# Patient Record
Sex: MALE | Race: WHITE | Employment: UNEMPLOYED | URBAN - METROPOLITAN AREA
[De-identification: names, ages, dates, MRNs, and addresses within clinical notes are randomized per-mention and may not be internally consistent; named-entity substitution may affect disease eponyms.]

---

## 2023-11-15 ENCOUNTER — OFFICE VISIT (OUTPATIENT)
Age: 23
End: 2023-11-15
Payer: COMMERCIAL

## 2023-11-15 VITALS
HEART RATE: 102 BPM | DIASTOLIC BLOOD PRESSURE: 78 MMHG | TEMPERATURE: 98.4 F | WEIGHT: 239.25 LBS | RESPIRATION RATE: 20 BRPM | HEIGHT: 73 IN | OXYGEN SATURATION: 100 % | SYSTOLIC BLOOD PRESSURE: 126 MMHG | BODY MASS INDEX: 31.71 KG/M2

## 2023-11-15 DIAGNOSIS — Z00.00 WELL ADULT EXAM: Primary | ICD-10-CM

## 2023-11-15 DIAGNOSIS — Z11.59 NEED FOR HEPATITIS C SCREENING TEST: ICD-10-CM

## 2023-11-15 DIAGNOSIS — F33.41 RECURRENT MAJOR DEPRESSIVE DISORDER, IN PARTIAL REMISSION (HCC): ICD-10-CM

## 2023-11-15 DIAGNOSIS — Z13.220 SCREENING, LIPID: ICD-10-CM

## 2023-11-15 DIAGNOSIS — Z23 ENCOUNTER FOR IMMUNIZATION: ICD-10-CM

## 2023-11-15 DIAGNOSIS — Z11.4 SCREENING FOR HIV (HUMAN IMMUNODEFICIENCY VIRUS): ICD-10-CM

## 2023-11-15 PROCEDURE — 90471 IMMUNIZATION ADMIN: CPT | Performed by: STUDENT IN AN ORGANIZED HEALTH CARE EDUCATION/TRAINING PROGRAM

## 2023-11-15 PROCEDURE — 99395 PREV VISIT EST AGE 18-39: CPT | Performed by: STUDENT IN AN ORGANIZED HEALTH CARE EDUCATION/TRAINING PROGRAM

## 2023-11-15 PROCEDURE — 90674 CCIIV4 VAC NO PRSV 0.5 ML IM: CPT | Performed by: STUDENT IN AN ORGANIZED HEALTH CARE EDUCATION/TRAINING PROGRAM

## 2023-11-15 RX ORDER — VENLAFAXINE HYDROCHLORIDE 150 MG/1
150 CAPSULE, EXTENDED RELEASE ORAL DAILY
COMMUNITY
Start: 2023-09-28

## 2023-11-15 RX ORDER — QUETIAPINE FUMARATE 50 MG/1
TABLET, EXTENDED RELEASE ORAL
COMMUNITY
Start: 2023-11-15

## 2023-11-15 SDOH — ECONOMIC STABILITY: INCOME INSECURITY: HOW HARD IS IT FOR YOU TO PAY FOR THE VERY BASICS LIKE FOOD, HOUSING, MEDICAL CARE, AND HEATING?: NOT HARD AT ALL

## 2023-11-15 SDOH — ECONOMIC STABILITY: HOUSING INSECURITY
IN THE LAST 12 MONTHS, WAS THERE A TIME WHEN YOU DID NOT HAVE A STEADY PLACE TO SLEEP OR SLEPT IN A SHELTER (INCLUDING NOW)?: NO

## 2023-11-15 SDOH — ECONOMIC STABILITY: FOOD INSECURITY: WITHIN THE PAST 12 MONTHS, YOU WORRIED THAT YOUR FOOD WOULD RUN OUT BEFORE YOU GOT MONEY TO BUY MORE.: NEVER TRUE

## 2023-11-15 SDOH — ECONOMIC STABILITY: FOOD INSECURITY: WITHIN THE PAST 12 MONTHS, THE FOOD YOU BOUGHT JUST DIDN'T LAST AND YOU DIDN'T HAVE MONEY TO GET MORE.: NEVER TRUE

## 2023-11-15 ASSESSMENT — ENCOUNTER SYMPTOMS
SHORTNESS OF BREATH: 0
VOMITING: 0
RHINORRHEA: 0
CHEST TIGHTNESS: 0
NAUSEA: 0
SORE THROAT: 0
DIARRHEA: 0
CONSTIPATION: 0

## 2023-11-15 ASSESSMENT — PATIENT HEALTH QUESTIONNAIRE - PHQ9
SUM OF ALL RESPONSES TO PHQ QUESTIONS 1-9: 0
SUM OF ALL RESPONSES TO PHQ9 QUESTIONS 1 & 2: 0
1. LITTLE INTEREST OR PLEASURE IN DOING THINGS: 0
2. FEELING DOWN, DEPRESSED OR HOPELESS: 0
SUM OF ALL RESPONSES TO PHQ QUESTIONS 1-9: 0

## 2023-11-15 NOTE — PATIENT INSTRUCTIONS
Patient Education        Learning About the Mediterranean Diet  What is the [address]? The Mediterranean diet is a style of eating rather than a diet plan. It features foods eaten in [facility], [place], [place], and other countries along the [facility]. It emphasizes eating foods like fish, fruits, vegetables, beans, high-fiber breads and whole grains, nuts, and olive oil. This style of eating includes limited red meat, cheese, and sweets. Why choose the Mediterranean diet? A Mediterranean-style diet may improve heart health. It contains more fat than other heart-healthy diets. But the fats are mainly from nuts, unsaturated oils (such as fish oils and olive oil), and certain nut or seed oils (such as canola, soybean, or flaxseed oil). These fats may help protect the heart and blood vessels. How can you get started on the Mediterranean diet? Here are some things you can do to switch to a more Mediterranean way of eating. What to eat  Eat a variety of fruits and vegetables each day, such as grapes, blueberries, tomatoes, broccoli, peppers, figs, olives, spinach, eggplant, beans, lentils, and chickpeas. Eat a variety of whole-grain foods each day, such as oats, brown rice, and whole wheat bread, pasta, and couscous. Eat fish at least 2 times a week. Try tuna, salmon, mackerel, lake trout, herring, or sardines. Eat moderate amounts of low-fat dairy products, such as milk, cheese, or yogurt. Eat moderate amounts of poultry and eggs. Choose healthy (unsaturated) fats, such as nuts, olive oil, and certain nut or seed oils like canola, soybean, and flaxseed. Limit unhealthy (saturated) fats, such as butter, palm oil, and coconut oil. And limit fats found in animal products, such as meat and dairy products made with whole milk. Try to eat red meat only a few times a month in very small amounts. Limit sweets and desserts to only a few times a week.  This includes sugar-sweetened

## 2023-11-15 NOTE — PROGRESS NOTES
After obtaining consent, and per orders of Dr. Paulie Thornton, injection of Reg flu given in Left deltoid by Martina Harrington MA. Patient tolerated the injection well.

## 2023-11-15 NOTE — PROGRESS NOTES
Date of Visit:  11/15/2023  Patient Name: Damien Rocha   Patient :  2000     CHIEF COMPLAINT/HPI:     Damien Rocha is a 26 y.o. male who presents today for an general visit to be evaluated for the following condition(s):  Chief Complaint   Patient presents with    Check-Up     Patient is here today for a checkup and medication review      Last night patient's dad came in to get a refill of Seroquel for the patient. He has been working on weaning off the Seroquel for quite some time now. The last time I saw the patient in the office was May 2022 so I could not provide refill but I did offer to see him for an acute visit today for the purpose of refilling the Seroquel. He tells me today that he actually lost his prescription bottle last night and he went home and found it so he no longer needs a refill. He is doing well on the 50 mg of Seroquel and he is still on 150 mg of Effexor. He is following with his psychiatrist and things are going well from a mental health standpoint. His depression is doing well. He came into the appointment today for the purpose of doing a well visit. He has no new concerns or issues today. We did discuss the obesity and he says he is not really working out and does not follow a strict diet. We did discuss this and the benefits of losing some weight and improving his lifestyle and he will think about this and try to make some changes. He will let me know if he wants any more help. Eye doc and Dentist exams UTD. UTD on shots - Due for flu shot today. UTD on shot he thinks. Psychiatrist is Dr. Olivia Grant (unsure of his last name)  At [address] [place] [address] Psychiatry. REVIEW OF SYSTEM      Review of Systems   Constitutional:  Negative for chills and fever. HENT:  Negative for hearing loss, postnasal drip, rhinorrhea and sore throat. Eyes:  Negative for visual disturbance. Respiratory:  Negative for chest tightness and shortness of breath.

## 2024-02-28 ENCOUNTER — TELEPHONE (OUTPATIENT)
Age: 24
End: 2024-02-28

## 2024-02-28 NOTE — TELEPHONE ENCOUNTER
Patient called he is starting an [employer] course 3/19/24, patient needs TB and flu shot before this, okay for NV?   Appt made with you for 3/27/24  Please advise

## 2024-02-28 NOTE — TELEPHONE ENCOUNTER
NV made for 3/1/24 at 9am, said he doesn't need anything signed by Dr. Lopez before his appt on 3/27/24

## 2024-03-01 ENCOUNTER — NURSE ONLY (OUTPATIENT)
Age: 24
End: 2024-03-01

## 2024-03-01 DIAGNOSIS — Z23 ENCOUNTER FOR IMMUNIZATION: Primary | ICD-10-CM

## 2024-03-01 NOTE — PROGRESS NOTES
TB was given to patient for school immunization check. Tolerated well. Approved by Dr. Lopez. Injection was given left forearm, patient will return Monday before 9AM to have this read

## 2024-03-04 ENCOUNTER — NURSE ONLY (OUTPATIENT)
Age: 24
End: 2024-03-04

## 2024-03-04 LAB
INDURATION: NORMAL
TB SKIN TEST: NEGATIVE

## 2024-03-04 NOTE — PATIENT INSTRUCTIONS
You are now able to schedule your own appointments by using your Da[facility] terry or you can always call the office to schedule at [phone].

## 2024-03-15 ENCOUNTER — NURSE ONLY (OUTPATIENT)
Age: 24
End: 2024-03-15

## 2024-03-15 DIAGNOSIS — Z11.1 VISIT FOR TB SKIN TEST: Primary | ICD-10-CM

## 2024-03-15 NOTE — PROGRESS NOTES
After obtaining consent, and per orders of Dr. Ty, injection of Mantoux skin test given in Left arm by Benito Cameron MA.

## 2024-03-18 LAB
INDURATION: NORMAL
TB SKIN TEST: NEGATIVE

## 2024-03-27 ENCOUNTER — OFFICE VISIT (OUTPATIENT)
Age: 24
End: 2024-03-27
Payer: COMMERCIAL

## 2024-03-27 VITALS
TEMPERATURE: 97.3 F | RESPIRATION RATE: 18 BRPM | HEIGHT: 73 IN | OXYGEN SATURATION: 99 % | SYSTOLIC BLOOD PRESSURE: 126 MMHG | HEART RATE: 101 BPM | BODY MASS INDEX: 31.69 KG/M2 | WEIGHT: 239.13 LBS | DIASTOLIC BLOOD PRESSURE: 80 MMHG

## 2024-03-27 DIAGNOSIS — F33.2 SEVERE RECURRENT MAJOR DEPRESSION WITHOUT PSYCHOTIC FEATURES (HCC): Primary | ICD-10-CM

## 2024-03-27 DIAGNOSIS — R00.0 TACHYCARDIA: ICD-10-CM

## 2024-03-27 DIAGNOSIS — E66.09 CLASS 1 OBESITY DUE TO EXCESS CALORIES WITHOUT SERIOUS COMORBIDITY WITH BODY MASS INDEX (BMI) OF 31.0 TO 31.9 IN ADULT: ICD-10-CM

## 2024-03-27 DIAGNOSIS — Z01.84 IMMUNITY STATUS TESTING: ICD-10-CM

## 2024-03-27 DIAGNOSIS — Z13.220 SCREENING, LIPID: ICD-10-CM

## 2024-03-27 DIAGNOSIS — F41.1 GENERALIZED ANXIETY DISORDER: ICD-10-CM

## 2024-03-27 PROCEDURE — 90471 IMMUNIZATION ADMIN: CPT | Performed by: STUDENT IN AN ORGANIZED HEALTH CARE EDUCATION/TRAINING PROGRAM

## 2024-03-27 PROCEDURE — 90715 TDAP VACCINE 7 YRS/> IM: CPT | Performed by: STUDENT IN AN ORGANIZED HEALTH CARE EDUCATION/TRAINING PROGRAM

## 2024-03-27 PROCEDURE — 99213 OFFICE O/P EST LOW 20 MIN: CPT | Performed by: STUDENT IN AN ORGANIZED HEALTH CARE EDUCATION/TRAINING PROGRAM

## 2024-03-27 ASSESSMENT — PATIENT HEALTH QUESTIONNAIRE - PHQ9
6. FEELING BAD ABOUT YOURSELF - OR THAT YOU ARE A FAILURE OR HAVE LET YOURSELF OR YOUR FAMILY DOWN: NOT AT ALL
10. IF YOU CHECKED OFF ANY PROBLEMS, HOW DIFFICULT HAVE THESE PROBLEMS MADE IT FOR YOU TO DO YOUR WORK, TAKE CARE OF THINGS AT HOME, OR GET ALONG WITH OTHER PEOPLE: NOT DIFFICULT AT ALL
2. FEELING DOWN, DEPRESSED OR HOPELESS: NOT AT ALL
SUM OF ALL RESPONSES TO PHQ QUESTIONS 1-9: 0
9. THOUGHTS THAT YOU WOULD BE BETTER OFF DEAD, OR OF HURTING YOURSELF: NOT AT ALL
SUM OF ALL RESPONSES TO PHQ QUESTIONS 1-9: 0
7. TROUBLE CONCENTRATING ON THINGS, SUCH AS READING THE NEWSPAPER OR WATCHING TELEVISION: NOT AT ALL
8. MOVING OR SPEAKING SO SLOWLY THAT OTHER PEOPLE COULD HAVE NOTICED. OR THE OPPOSITE, BEING SO FIGETY OR RESTLESS THAT YOU HAVE BEEN MOVING AROUND A LOT MORE THAN USUAL: NOT AT ALL
SUM OF ALL RESPONSES TO PHQ9 QUESTIONS 1 & 2: 0
5. POOR APPETITE OR OVEREATING: NOT AT ALL
3. TROUBLE FALLING OR STAYING ASLEEP: NOT AT ALL
4. FEELING TIRED OR HAVING LITTLE ENERGY: NOT AT ALL
1. LITTLE INTEREST OR PLEASURE IN DOING THINGS: NOT AT ALL

## 2024-03-27 ASSESSMENT — ENCOUNTER SYMPTOMS
SORE THROAT: 0
SHORTNESS OF BREATH: 0
DIARRHEA: 0
NAUSEA: 0
CONSTIPATION: 0
RHINORRHEA: 0
VOMITING: 0
CHEST TIGHTNESS: 0

## 2024-03-27 NOTE — PROGRESS NOTES
After obtaining consent, and per orders of Dr. Lopez, injection of TDAP given in Left deltoid by TARAN DE LEON MA. VIS was signed prior to injection, copy was given to the patient. Patient tolerated the injection well.

## 2024-03-27 NOTE — PROGRESS NOTES
Date of Visit:  3/27/2024  Patient Name: Mert Epstein   Patient :  2000     CHIEF COMPLAINT/HPI:     Mert Epstein is a 24 y.o. 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   He has no specific issues to discuss today.  He does not have any signs of illness.  He had TB testing done for different position and he already has this documentation so it was not filled out during his forms today.  He needs some titers for school which will be ordered once these are back we will fill out the rest of the form.  For now his physical exam form was completed.    About 6 months ago he had a wellness physical so today's visit will not be billed that I but we did also discuss his depression and anxiety and is doing very well and it is well-controlled and he has good follow-up with his psychiatrist.      Tachycardia: He did see cardiology in the past had an echo and a Holter which were normal.  No follow-up was needed.  Patient states his heart rate does still run in the 80s to 100s periodically.  We are going to continue to keep an eye on this.  He has an Apple Watch and he monitors it if it consistently starts going higher than 100 I would really visit the cardiologist.    Overall patient is asymptomatic.  He is due for a tetanus shot today.  Risk and benefit discussed he is agreeable to get it.      REVIEW OF SYSTEM      Review of Systems   Constitutional:  Negative for chills and fever.   HENT:  Negative for hearing loss, postnasal drip, rhinorrhea and sore throat.    Eyes:  Negative for visual disturbance.   Respiratory:  Negative for chest tightness and shortness of breath.    Cardiovascular:  Negative for chest pain and

## 2024-04-17 LAB
ALBUMIN SERPL-MCNC: 4.7 G/DL
ALP BLD-CCNC: 72 U/L
ALT SERPL-CCNC: 82 U/L
ANION GAP SERPL CALCULATED.3IONS-SCNC: 10 MMOL/L
AST SERPL-CCNC: 33 U/L
BASOPHILS ABSOLUTE: 0.1 /ΜL
BASOPHILS RELATIVE PERCENT: 0.8 %
BILIRUB SERPL-MCNC: 1 MG/DL (ref 0.1–1.4)
BUN BLDV-MCNC: 10 MG/DL
CALCIUM SERPL-MCNC: 9.7 MG/DL
CHLORIDE BLD-SCNC: 103 MMOL/L
CHOLESTEROL, TOTAL: 113 MG/DL
CHOLESTEROL/HDL RATIO: 2
CO2: 26 MMOL/L
CREAT SERPL-MCNC: 0.61 MG/DL
EGFR: ABNORMAL
EOSINOPHILS ABSOLUTE: 0.1 /ΜL
EOSINOPHILS RELATIVE PERCENT: 2.1 %
GLUCOSE BLD-MCNC: 91 MG/DL
HCT VFR BLD CALC: 41.8 % (ref 41–53)
HDLC SERPL-MCNC: 57 MG/DL (ref 35–70)
HEMOGLOBIN: 14.4 G/DL (ref 13.5–17.5)
LDL CHOLESTEROL CALCULATED: 40 MG/DL (ref 0–160)
LYMPHOCYTES ABSOLUTE: 1.8 /ΜL
LYMPHOCYTES RELATIVE PERCENT: 28.8 %
MCH RBC QN AUTO: 29.3 PG
MCHC RBC AUTO-ENTMCNC: 34.4 G/DL
MCV RBC AUTO: 85 FL
MONOCYTES ABSOLUTE: 0.5 /ΜL
MONOCYTES RELATIVE PERCENT: 8.2 %
NEUTROPHILS ABSOLUTE: 3.9 /ΜL
NEUTROPHILS RELATIVE PERCENT: 60.1 %
NONHDLC SERPL-MCNC: NORMAL MG/DL
PDW BLD-RTO: 13.5 %
PLATELET # BLD: 285 K/ΜL
PMV BLD AUTO: 9.1 FL
POTASSIUM SERPL-SCNC: 3.8 MMOL/L
RBC # BLD: 4.91 10^6/ΜL
SODIUM BLD-SCNC: 139 MMOL/L
TOTAL PROTEIN: 7.3
TRIGL SERPL-MCNC: 81 MG/DL
TSH SERPL DL<=0.05 MIU/L-ACNC: 3.15 UIU/ML
VLDLC SERPL CALC-MCNC: 16 MG/DL
WBC # BLD: 6.4 10^3/ML

## 2024-04-18 DIAGNOSIS — E66.09 CLASS 1 OBESITY DUE TO EXCESS CALORIES WITHOUT SERIOUS COMORBIDITY WITH BODY MASS INDEX (BMI) OF 31.0 TO 31.9 IN ADULT: ICD-10-CM

## 2024-04-18 DIAGNOSIS — Z13.220 SCREENING, LIPID: ICD-10-CM

## 2024-04-18 DIAGNOSIS — F33.2 SEVERE RECURRENT MAJOR DEPRESSION WITHOUT PSYCHOTIC FEATURES (HCC): ICD-10-CM

## 2024-04-18 DIAGNOSIS — F41.1 GENERALIZED ANXIETY DISORDER: ICD-10-CM

## 2024-04-18 DIAGNOSIS — R00.0 TACHYCARDIA: ICD-10-CM

## 2024-04-19 DIAGNOSIS — Z01.84 IMMUNITY STATUS TESTING: ICD-10-CM

## 2024-04-22 ENCOUNTER — OFFICE VISIT (OUTPATIENT)
Age: 24
End: 2024-04-22
Payer: COMMERCIAL

## 2024-04-22 VITALS
HEIGHT: 73 IN | OXYGEN SATURATION: 100 % | HEART RATE: 98 BPM | BODY MASS INDEX: 31.68 KG/M2 | DIASTOLIC BLOOD PRESSURE: 74 MMHG | WEIGHT: 239 LBS | SYSTOLIC BLOOD PRESSURE: 122 MMHG

## 2024-04-22 DIAGNOSIS — R74.01 ALT (SGPT) LEVEL RAISED: Primary | ICD-10-CM

## 2024-04-22 DIAGNOSIS — F41.1 GENERALIZED ANXIETY DISORDER: ICD-10-CM

## 2024-04-22 DIAGNOSIS — Z01.84 IMMUNITY STATUS TESTING: ICD-10-CM

## 2024-04-22 DIAGNOSIS — F33.2 SEVERE RECURRENT MAJOR DEPRESSION WITHOUT PSYCHOTIC FEATURES (HCC): ICD-10-CM

## 2024-04-22 DIAGNOSIS — R00.0 TACHYCARDIA: ICD-10-CM

## 2024-04-22 PROCEDURE — 99213 OFFICE O/P EST LOW 20 MIN: CPT | Performed by: STUDENT IN AN ORGANIZED HEALTH CARE EDUCATION/TRAINING PROGRAM

## 2024-04-22 PROCEDURE — 90746 HEPB VACCINE 3 DOSE ADULT IM: CPT | Performed by: STUDENT IN AN ORGANIZED HEALTH CARE EDUCATION/TRAINING PROGRAM

## 2024-04-22 PROCEDURE — 90471 IMMUNIZATION ADMIN: CPT | Performed by: STUDENT IN AN ORGANIZED HEALTH CARE EDUCATION/TRAINING PROGRAM

## 2024-04-22 ASSESSMENT — ENCOUNTER SYMPTOMS
SHORTNESS OF BREATH: 0
VOMITING: 0
SORE THROAT: 0
RHINORRHEA: 0
CONSTIPATION: 0
NAUSEA: 0
DIARRHEA: 0
CHEST TIGHTNESS: 0

## 2024-04-22 NOTE — PROGRESS NOTES
After obtaining consent, and per orders of Dr. Lopez, injection of Heb B-Engerix given in Left deltoid by ASHELY WHITNEY MA. Patient tolerated the injection well.   
labs from 4/17/2024, CMP, CBC essentially within normal limits.  The ALT was just slightly elevated so we will repeat that test again in 2 weeks.  TSH within normal limits and TB testing was negative.  Lipid panel was favorable.    Return in about 3 months (around 7/22/2024).    If any labs/imaging/testing is ordered then patient is informed that no news is not always good news and that s/he should call the office within 1 week if s/he does not hear from us regarding the results of his/her testing.  Pt understands his/her responsibility in following up on the results. S/he is agreeable to this plan.       COMMUNICATION:       PLEASE NOTE THAT ANY DISCONTINUATION OF MEDICATIONS OR MEDICAL SUPPLIES REFLECTED IN TODAY'S VISIT SUMMARY  MAY NOT HAVE BEEN COMPLETED AS A CHANGE IN YOUR PLAN OF CARE. THESE CHANGES MAY HAVE ONLY BEEN DONE SO IN ORDER TO CLEAN UP THE LIST FROM DUPLICATIONS OR MISCELLANEOUS SUPPLIES ONLY NEEDED PERIODIC REORDERS. DO NOT DISCONTINUE MEDICATIONS LISTED UNLESS SPECIFICALLY DISCUSSED IN YOUR APPOINTMENT WITH PROVIDER OR SPECIALIST, IF YOU HAVE ANY QUESTIONS, PLEASE CONTACT YOUR PROVIDER FOR CLARIFICATION IF NOT ADDRESSED IN YOUR PLAN OF CARE.       Electronically signed by Byron Lopez MD on 4/22/2024 at 10:49 AM  
Strong peripheral pulses

## 2024-05-06 LAB
ALBUMIN SERPL-MCNC: 4.5 G/DL
ALP BLD-CCNC: 68 U/L
ALT SERPL-CCNC: 68 U/L
ANION GAP SERPL CALCULATED.3IONS-SCNC: 12 MMOL/L
AST SERPL-CCNC: 26 U/L
BILIRUB SERPL-MCNC: 0.7 MG/DL (ref 0.1–1.4)
BUN BLDV-MCNC: 11 MG/DL
CALCIUM SERPL-MCNC: 9.4 MG/DL
CHLORIDE BLD-SCNC: 101 MMOL/L
CO2: 27 MMOL/L
CREAT SERPL-MCNC: 0.75 MG/DL
EGFR: NORMAL
GLUCOSE BLD-MCNC: 95 MG/DL
POTASSIUM SERPL-SCNC: 3.9 MMOL/L
SODIUM BLD-SCNC: 140 MMOL/L
TOTAL PROTEIN: 7

## 2024-05-07 DIAGNOSIS — Z00.00 WELL ADULT EXAM: ICD-10-CM

## 2024-05-13 ENCOUNTER — TELEPHONE (OUTPATIENT)
Age: 24
End: 2024-05-13

## 2024-05-13 NOTE — TELEPHONE ENCOUNTER
It is usually dependent on the school -the form usually says what he has to do.  Sometimes a booster is good enough.  It is up to him to if he is happy with the rest of booster I think it would be reasonable.  If he wants to repeat the whole series we can do that as well.

## 2024-05-13 NOTE — TELEPHONE ENCOUNTER
Patient called back he would like to redo the whole series because he has to do a blood test if he only gets the booster so would like to do the whole series.

## 2024-05-13 NOTE — TELEPHONE ENCOUNTER
Ok thank you, please let me know what ferny hep B he got last time and I will let you know when he should come back in for a shot.

## 2024-05-13 NOTE — TELEPHONE ENCOUNTER
Patient called he was wondering if you wanted him to do the whole series of hep B or just a booster?   Please advise

## 2024-05-14 NOTE — TELEPHONE ENCOUNTER
Ok great thank you he should get it at 0,1, and 6 months - so he can get his next dose after 5/22/24 and then after 10/22/2024 for the last dose.     He should get the same med -Engerix-B  each time.     Thank you

## 2024-05-23 ENCOUNTER — NURSE ONLY (OUTPATIENT)
Age: 24
End: 2024-05-23
Payer: COMMERCIAL

## 2024-05-23 DIAGNOSIS — Z23 ENCOUNTER FOR IMMUNIZATION: Primary | ICD-10-CM

## 2024-05-23 PROCEDURE — 90471 IMMUNIZATION ADMIN: CPT | Performed by: STUDENT IN AN ORGANIZED HEALTH CARE EDUCATION/TRAINING PROGRAM

## 2024-05-23 PROCEDURE — 90746 HEPB VACCINE 3 DOSE ADULT IM: CPT | Performed by: STUDENT IN AN ORGANIZED HEALTH CARE EDUCATION/TRAINING PROGRAM

## 2024-10-23 ENCOUNTER — OFFICE VISIT (OUTPATIENT)
Age: 24
End: 2024-10-23
Payer: COMMERCIAL

## 2024-10-23 VITALS
SYSTOLIC BLOOD PRESSURE: 138 MMHG | WEIGHT: 234.5 LBS | OXYGEN SATURATION: 98 % | BODY MASS INDEX: 31.08 KG/M2 | HEIGHT: 73 IN | HEART RATE: 110 BPM | DIASTOLIC BLOOD PRESSURE: 88 MMHG | TEMPERATURE: 97.8 F

## 2024-10-23 DIAGNOSIS — Z01.84 IMMUNITY STATUS TESTING: ICD-10-CM

## 2024-10-23 DIAGNOSIS — R00.0 TACHYCARDIA: Primary | ICD-10-CM

## 2024-10-23 PROCEDURE — 99212 OFFICE O/P EST SF 10 MIN: CPT | Performed by: STUDENT IN AN ORGANIZED HEALTH CARE EDUCATION/TRAINING PROGRAM

## 2024-10-23 PROCEDURE — 90471 IMMUNIZATION ADMIN: CPT | Performed by: STUDENT IN AN ORGANIZED HEALTH CARE EDUCATION/TRAINING PROGRAM

## 2024-10-23 PROCEDURE — 90746 HEPB VACCINE 3 DOSE ADULT IM: CPT | Performed by: STUDENT IN AN ORGANIZED HEALTH CARE EDUCATION/TRAINING PROGRAM

## 2024-10-23 RX ORDER — VENLAFAXINE HYDROCHLORIDE 37.5 MG/1
37.5 CAPSULE, EXTENDED RELEASE ORAL DAILY
COMMUNITY

## 2024-10-23 RX ORDER — VENLAFAXINE HCL 75 MG
75 CAPSULE, EXT RELEASE 24 HR ORAL DAILY
COMMUNITY

## 2024-10-23 ASSESSMENT — ENCOUNTER SYMPTOMS
RHINORRHEA: 0
SORE THROAT: 0
NAUSEA: 0
SHORTNESS OF BREATH: 0
CONSTIPATION: 0
VOMITING: 0
DIARRHEA: 0
CHEST TIGHTNESS: 0

## 2024-10-23 NOTE — PROGRESS NOTES
After obtaining consent, and per orders of Dr. KATIUSKA Lopez, injection of Hep B #3 given in Right deltoid by Sunshine Drummond. Patient tolerated the injection well. VIS was given

## 2024-10-23 NOTE — PROGRESS NOTES
Date of Visit:  10/24/2024  Patient Name: Mert Epstein   Patient :  2000     CHIEF COMPLAINT/HPI:     Mert Epstein is a 24 y.o. male who presents today for an general visit to be evaluated for the following condition(s):  Chief Complaint   Patient presents with    Other     He is here for his routine check up without labs and HEP B #3.         Tachycardia: He did see cardiology in the past had an echo and a Holter which were normal.  No follow-up was needed.  Patient states his heart rate does still run in the 80s to 100s periodically.  We are going to continue to keep an eye on this.  He has been monitoring it with his Apple Watch and his resting heart rate is around 87.  He is asymptomatic.  His resting heart rate on his Apple Watch is around 80 still.    Pt was not immune to Hep B so he is repeating the series.               REVIEW OF SYSTEM      Review of Systems   Constitutional:  Negative for chills and fever.   HENT:  Negative for hearing loss, postnasal drip, rhinorrhea and sore throat.    Eyes:  Negative for visual disturbance.   Respiratory:  Negative for chest tightness and shortness of breath.    Cardiovascular:  Negative for chest pain and palpitations.   Gastrointestinal:  Negative for constipation, diarrhea, nausea and vomiting.   Genitourinary:  Negative for dysuria.   Musculoskeletal:  Negative for arthralgias.   Skin:  Negative for rash.   Neurological:  Negative for dizziness, weakness, light-headedness, numbness and headaches.         REVIEWED INFORMATION      Allergies   Allergen Reactions    Minocycline        Current Outpatient Medications   Medication Sig Dispense Refill    venlafaxine (EFFEXOR XR) 37.5 MG extended release capsule Take 1 capsule by mouth daily      EFFEXOR XR 75 MG extended release capsule Take 1 capsule by mouth daily      QUEtiapine (SEROQUEL XR) 50 MG extended release tablet        No current facility-administered medications for this visit.

## 2025-02-12 ENCOUNTER — TELEPHONE (OUTPATIENT)
Age: 25
End: 2025-02-12

## 2025-02-12 DIAGNOSIS — Z11.1 TUBERCULOSIS SCREENING: ICD-10-CM

## 2025-02-12 DIAGNOSIS — Z00.00 WELL ADULT EXAM: Primary | ICD-10-CM

## 2025-02-12 DIAGNOSIS — Z11.4 SCREENING FOR HIV (HUMAN IMMUNODEFICIENCY VIRUS): ICD-10-CM

## 2025-02-12 DIAGNOSIS — Z11.59 NEED FOR HEPATITIS C SCREENING TEST: ICD-10-CM

## 2025-03-03 ENCOUNTER — OFFICE VISIT (OUTPATIENT)
Age: 25
End: 2025-03-03
Payer: COMMERCIAL

## 2025-03-03 VITALS
BODY MASS INDEX: 31.83 KG/M2 | RESPIRATION RATE: 12 BRPM | HEART RATE: 89 BPM | DIASTOLIC BLOOD PRESSURE: 78 MMHG | OXYGEN SATURATION: 98 % | SYSTOLIC BLOOD PRESSURE: 128 MMHG | WEIGHT: 235 LBS | HEIGHT: 72 IN

## 2025-03-03 DIAGNOSIS — Z00.00 WELL ADULT EXAM: Primary | ICD-10-CM

## 2025-03-03 DIAGNOSIS — F41.1 GENERALIZED ANXIETY DISORDER: ICD-10-CM

## 2025-03-03 DIAGNOSIS — F33.2 SEVERE RECURRENT MAJOR DEPRESSION WITHOUT PSYCHOTIC FEATURES (HCC): ICD-10-CM

## 2025-03-03 PROCEDURE — 99395 PREV VISIT EST AGE 18-39: CPT | Performed by: STUDENT IN AN ORGANIZED HEALTH CARE EDUCATION/TRAINING PROGRAM

## 2025-03-03 SDOH — ECONOMIC STABILITY: FOOD INSECURITY: WITHIN THE PAST 12 MONTHS, YOU WORRIED THAT YOUR FOOD WOULD RUN OUT BEFORE YOU GOT MONEY TO BUY MORE.: NEVER TRUE

## 2025-03-03 SDOH — ECONOMIC STABILITY: FOOD INSECURITY: WITHIN THE PAST 12 MONTHS, THE FOOD YOU BOUGHT JUST DIDN'T LAST AND YOU DIDN'T HAVE MONEY TO GET MORE.: NEVER TRUE

## 2025-03-03 ASSESSMENT — ENCOUNTER SYMPTOMS
SORE THROAT: 0
CONSTIPATION: 0
NAUSEA: 0
RHINORRHEA: 0
DIARRHEA: 0
VOMITING: 0
SHORTNESS OF BREATH: 0
CHEST TIGHTNESS: 0

## 2025-03-03 ASSESSMENT — PATIENT HEALTH QUESTIONNAIRE - PHQ9
2. FEELING DOWN, DEPRESSED OR HOPELESS: NOT AT ALL
4. FEELING TIRED OR HAVING LITTLE ENERGY: NOT AT ALL
6. FEELING BAD ABOUT YOURSELF - OR THAT YOU ARE A FAILURE OR HAVE LET YOURSELF OR YOUR FAMILY DOWN: NOT AT ALL
1. LITTLE INTEREST OR PLEASURE IN DOING THINGS: NOT AT ALL
SUM OF ALL RESPONSES TO PHQ QUESTIONS 1-9: 0
SUM OF ALL RESPONSES TO PHQ QUESTIONS 1-9: 0
3. TROUBLE FALLING OR STAYING ASLEEP: NOT AT ALL
7. TROUBLE CONCENTRATING ON THINGS, SUCH AS READING THE NEWSPAPER OR WATCHING TELEVISION: NOT AT ALL
9. THOUGHTS THAT YOU WOULD BE BETTER OFF DEAD, OR OF HURTING YOURSELF: NOT AT ALL
5. POOR APPETITE OR OVEREATING: NOT AT ALL
SUM OF ALL RESPONSES TO PHQ QUESTIONS 1-9: 0
10. IF YOU CHECKED OFF ANY PROBLEMS, HOW DIFFICULT HAVE THESE PROBLEMS MADE IT FOR YOU TO DO YOUR WORK, TAKE CARE OF THINGS AT HOME, OR GET ALONG WITH OTHER PEOPLE: NOT DIFFICULT AT ALL
SUM OF ALL RESPONSES TO PHQ QUESTIONS 1-9: 0
8. MOVING OR SPEAKING SO SLOWLY THAT OTHER PEOPLE COULD HAVE NOTICED. OR THE OPPOSITE, BEING SO FIGETY OR RESTLESS THAT YOU HAVE BEEN MOVING AROUND A LOT MORE THAN USUAL: NOT AT ALL

## 2025-03-03 NOTE — PROGRESS NOTES
Mert Epstein (:  2000) is a 24 y.o. male, Established patient, here for evaluation of the following chief complaint(s):  Annual Exam         Assessment & Plan  1. Routine physical examination.  Her overall health status is satisfactory, with no significant concerns identified during the visit. A QuantiFERON test has been ordered and is currently pending. She has been advised to continue his regular appointments with his psychiatric providers. Routine labs, including HIV and TB screening, have been ordered. She will be notified if any abnormalities are detected in the lab results. If he does not receive a call, he should contact the office as sometimes results may not be received.    Follow-up  The patient will follow up in 1 year.    Results    1. Well adult exam  2. Generalized anxiety disorder  3. Severe recurrent major depression without psychotic features (HCC)    No follow-ups on file.       Subjective   History of Present Illness  The patient presents for a physical exam.    he reports no significant new health issues or concerns at this time. he is currently under the care of a psychiatrist for anxiety and depression, managed with Effexor and Seroquel. he reports no suicidal ideation or homicidal thoughts. he has no known latex allergy but reports an allergy to MINOCYCLINE. She does not have any history of asthma, diabetes, or cardiac conditions. She reports no palpitations or other cardiac-related symptoms.    SOCIAL HISTORY  She is currently enrolled at [employer].    ALLERGIES  The patient is allergic to MINOCYCLINE.    MEDICATIONS  Effexor, Seroquel    Review of Systems   Constitutional:  Negative for chills and fever.   HENT:  Negative for hearing loss, postnasal drip, rhinorrhea and sore throat.    Eyes:  Negative for visual disturbance.   Respiratory:  Negative for chest tightness and shortness of breath.    Cardiovascular:  Negative for chest pain and palpitations.

## 2025-03-11 ENCOUNTER — HOSPITAL ENCOUNTER (OUTPATIENT)
Age: 25
Setting detail: SPECIMEN
Discharge: HOME | End: 2025-03-11

## 2025-03-11 DIAGNOSIS — Z00.00 WELL ADULT EXAM: ICD-10-CM

## 2025-03-11 DIAGNOSIS — Z11.4 SCREENING FOR HIV (HUMAN IMMUNODEFICIENCY VIRUS): ICD-10-CM

## 2025-03-11 DIAGNOSIS — Z11.59 NEED FOR HEPATITIS C SCREENING TEST: ICD-10-CM

## 2025-03-11 DIAGNOSIS — Z11.1 TUBERCULOSIS SCREENING: ICD-10-CM

## 2025-03-11 LAB
ALBUMIN SERPL-MCNC: 4.6 G/DL (ref 3.5–5.2)
ALBUMIN/GLOB SERPL: 1.9 {RATIO} (ref 1–2.5)
ALP SERPL-CCNC: 69 U/L (ref 40–129)
ALT SERPL-CCNC: 58 U/L (ref 10–50)
ANION GAP SERPL CALCULATED.3IONS-SCNC: 12 MMOL/L (ref 9–16)
AST SERPL-CCNC: 27 U/L (ref 10–50)
BASOPHILS # BLD: 0.06 K/UL (ref 0–0.2)
BASOPHILS NFR BLD: 1 % (ref 0–2)
BILIRUB SERPL-MCNC: 0.6 MG/DL (ref 0–1.2)
BUN SERPL-MCNC: 13 MG/DL (ref 6–20)
CALCIUM SERPL-MCNC: 9.3 MG/DL (ref 8.6–10.4)
CHLORIDE SERPL-SCNC: 106 MMOL/L (ref 98–107)
CHOLEST SERPL-MCNC: 116 MG/DL (ref 0–199)
CHOLESTEROL/HDL RATIO: 2.3
CO2 SERPL-SCNC: 24 MMOL/L (ref 20–31)
CREAT SERPL-MCNC: 0.7 MG/DL (ref 0.7–1.2)
EOSINOPHIL # BLD: 0.09 K/UL (ref 0–0.44)
EOSINOPHILS RELATIVE PERCENT: 2 % (ref 1–4)
ERYTHROCYTE [DISTWIDTH] IN BLOOD BY AUTOMATED COUNT: 12.7 % (ref 11.8–14.4)
GFR, ESTIMATED: >90 ML/MIN/1.73M2
GLUCOSE SERPL-MCNC: 99 MG/DL (ref 74–99)
HCT VFR BLD AUTO: 43.4 % (ref 40.7–50.3)
HCV AB SERPL QL IA: NONREACTIVE
HDLC SERPL-MCNC: 51 MG/DL
HGB BLD-MCNC: 14.1 G/DL (ref 13–17)
HIV 1+2 AB+HIV1 P24 AG SERPL QL IA: NONREACTIVE
IMM GRANULOCYTES # BLD AUTO: 0.06 K/UL (ref 0–0.3)
IMM GRANULOCYTES NFR BLD: 1 %
LDLC SERPL CALC-MCNC: 48 MG/DL (ref 0–100)
LYMPHOCYTES NFR BLD: 1.57 K/UL (ref 1.1–3.7)
LYMPHOCYTES RELATIVE PERCENT: 27 % (ref 24–43)
MCH RBC QN AUTO: 28 PG (ref 25.2–33.5)
MCHC RBC AUTO-ENTMCNC: 32.5 G/DL (ref 28.4–34.8)
MCV RBC AUTO: 86.1 FL (ref 82.6–102.9)
MONOCYTES NFR BLD: 0.42 K/UL (ref 0.1–1.2)
MONOCYTES NFR BLD: 7 % (ref 3–12)
NEUTROPHILS NFR BLD: 62 % (ref 36–65)
NEUTS SEG NFR BLD: 3.73 K/UL (ref 1.5–8.1)
NRBC BLD-RTO: 0 PER 100 WBC
PLATELET # BLD AUTO: 285 K/UL (ref 138–453)
PMV BLD AUTO: 10.8 FL (ref 8.1–13.5)
POTASSIUM SERPL-SCNC: 4.4 MMOL/L (ref 3.7–5.3)
PROT SERPL-MCNC: 7 G/DL (ref 6.6–8.7)
RBC # BLD AUTO: 5.04 M/UL (ref 4.21–5.77)
SODIUM SERPL-SCNC: 142 MMOL/L (ref 136–145)
TRIGL SERPL-MCNC: 86 MG/DL
TSH SERPL DL<=0.05 MIU/L-ACNC: 1.69 UIU/ML (ref 0.27–4.2)
VLDLC SERPL CALC-MCNC: 17 MG/DL (ref 1–30)
WBC OTHER # BLD: 5.9 K/UL (ref 3.5–11.3)

## 2025-03-12 ENCOUNTER — RESULTS FOLLOW-UP (OUTPATIENT)
Age: 25
End: 2025-03-12

## 2025-03-12 DIAGNOSIS — R74.01 ALT (SGPT) LEVEL RAISED: Primary | ICD-10-CM

## 2025-03-13 LAB
QUANTI TB GOLD PLUS: NEGATIVE
QUANTI TB1 MINUS NIL: 0.01 IU/ML
QUANTI TB2 MINUS NIL: 0.01 IU/ML
QUANTIFERON MITOGEN: 9.98 IU/ML
QUANTIFERON NIL: 0.02 IU/ML